# Patient Record
Sex: MALE | Race: WHITE | Employment: FULL TIME | URBAN - METROPOLITAN AREA
[De-identification: names, ages, dates, MRNs, and addresses within clinical notes are randomized per-mention and may not be internally consistent; named-entity substitution may affect disease eponyms.]

---

## 2017-09-11 ENCOUNTER — CLINICAL SUPPORT (OUTPATIENT)
Dept: CARDIOLOGY CLINIC | Age: 57
End: 2017-09-11

## 2017-09-11 DIAGNOSIS — Z02.89 EXAMINATION, PHYSICAL, EMPLOYEE: Primary | ICD-10-CM

## 2017-09-11 NOTE — PROGRESS NOTES
See scanned report. Heike Dawn ordered study and Dr. Greg Duke read study. ID verified per protocol. Test and risks explained. Consent signed after all patient questions answered. Patient developed dyspnea during test. At 3 minutes in recovery, patient without symptoms or complaints voiced.

## 2018-09-24 ENCOUNTER — HOSPITAL ENCOUNTER (OUTPATIENT)
Dept: CT IMAGING | Age: 58
Discharge: HOME | End: 2018-09-24
Attending: FAMILY MEDICINE

## 2018-09-24 DIAGNOSIS — Z13.6 SCREENING FOR CARDIOVASCULAR CONDITION: ICD-10-CM

## 2018-09-24 PROCEDURE — 75571 CT HRT W/O DYE W/CA TEST: CPT
